# Patient Record
Sex: FEMALE | Race: WHITE | NOT HISPANIC OR LATINO | URBAN - METROPOLITAN AREA
[De-identification: names, ages, dates, MRNs, and addresses within clinical notes are randomized per-mention and may not be internally consistent; named-entity substitution may affect disease eponyms.]

---

## 2018-08-24 ENCOUNTER — APPOINTMENT (RX ONLY)
Dept: URBAN - METROPOLITAN AREA CLINIC 15 | Facility: CLINIC | Age: 58
Setting detail: DERMATOLOGY
End: 2018-08-24

## 2018-08-24 DIAGNOSIS — D22 MELANOCYTIC NEVI: ICD-10-CM

## 2018-08-24 DIAGNOSIS — D18.0 HEMANGIOMA: ICD-10-CM

## 2018-08-24 DIAGNOSIS — L57.3 POIKILODERMA OF CIVATTE: ICD-10-CM

## 2018-08-24 DIAGNOSIS — Z41.9 ENCOUNTER FOR PROCEDURE FOR PURPOSES OTHER THAN REMEDYING HEALTH STATE, UNSPECIFIED: ICD-10-CM

## 2018-08-24 DIAGNOSIS — L30.0 NUMMULAR DERMATITIS: ICD-10-CM

## 2018-08-24 PROBLEM — D22.5 MELANOCYTIC NEVI OF TRUNK: Status: ACTIVE | Noted: 2018-08-24

## 2018-08-24 PROBLEM — D18.01 HEMANGIOMA OF SKIN AND SUBCUTANEOUS TISSUE: Status: ACTIVE | Noted: 2018-08-24

## 2018-08-24 PROCEDURE — ? COUNSELING

## 2018-08-24 PROCEDURE — 99203 OFFICE O/P NEW LOW 30 MIN: CPT

## 2018-08-24 PROCEDURE — ? PRESCRIPTION

## 2018-08-24 PROCEDURE — ? RECOMMENDATIONS

## 2018-08-24 PROCEDURE — ? MEDICAL CONSULTATION: FILLERS

## 2018-08-24 RX ORDER — FLUOCINONIDE 0.5 MG/G
OINTMENT TOPICAL
Qty: 1 | Refills: 1 | Status: ERX | COMMUNITY
Start: 2018-08-24

## 2018-08-24 RX ADMIN — FLUOCINONIDE: 0.5 OINTMENT TOPICAL at 00:00

## 2018-08-24 ASSESSMENT — LOCATION SIMPLE DESCRIPTION DERM
LOCATION SIMPLE: RIGHT LOWER BACK
LOCATION SIMPLE: RIGHT BACK
LOCATION SIMPLE: RIGHT CHEEK
LOCATION SIMPLE: LEFT LOWER BACK
LOCATION SIMPLE: LEFT CHEEK
LOCATION SIMPLE: LEFT UPPER BACK
LOCATION SIMPLE: CHEST
LOCATION SIMPLE: RIGHT UPPER BACK

## 2018-08-24 ASSESSMENT — LOCATION DETAILED DESCRIPTION DERM
LOCATION DETAILED: LEFT SUPERIOR LATERAL LOWER BACK
LOCATION DETAILED: LEFT CENTRAL MALAR CHEEK
LOCATION DETAILED: RIGHT SUPERIOR LATERAL LOWER BACK
LOCATION DETAILED: LEFT MEDIAL SUPERIOR CHEST
LOCATION DETAILED: RIGHT INFERIOR UPPER BACK
LOCATION DETAILED: LEFT SUPERIOR UPPER BACK
LOCATION DETAILED: RIGHT INFERIOR MEDIAL MALAR CHEEK
LOCATION DETAILED: LEFT MID-UPPER BACK
LOCATION DETAILED: RIGHT SUPERIOR LATERAL UPPER BACK

## 2018-08-24 ASSESSMENT — SEVERITY ASSESSMENT: SEVERITY: MILD

## 2018-08-24 ASSESSMENT — LOCATION ZONE DERM
LOCATION ZONE: FACE
LOCATION ZONE: TRUNK

## 2018-08-24 ASSESSMENT — BSA RASH: BSA RASH: 1

## 2018-08-24 NOTE — PROCEDURE: RECOMMENDATIONS
Recommendation Preamble: The following recommendations were made during the visit:
Recommendations (Free Text): Fraxel laser $1200
Detail Level: Zone
Recommendations (Free Text): Restylane refyne 1 syringe $850 \\nRestylane defyne 1 syringe $850\\n\\n\\nClear and Brilliant face $500

## 2019-12-09 ENCOUNTER — APPOINTMENT (RX ONLY)
Dept: URBAN - METROPOLITAN AREA CLINIC 15 | Facility: CLINIC | Age: 59
Setting detail: DERMATOLOGY
End: 2019-12-09

## 2019-12-09 DIAGNOSIS — Z41.9 ENCOUNTER FOR PROCEDURE FOR PURPOSES OTHER THAN REMEDYING HEALTH STATE, UNSPECIFIED: ICD-10-CM

## 2019-12-09 PROCEDURE — ? ADDITIONAL NOTES

## 2019-12-09 PROCEDURE — ? MEDICAL CONSULTATION: FRACTIONAL RESURFACING

## 2019-12-09 PROCEDURE — ? COSMETIC CONSULTATION: FILLERS

## 2019-12-09 PROCEDURE — ? FILLERS

## 2019-12-09 PROCEDURE — ? COSMETIC CONSULTATION: SKIN PEN

## 2019-12-09 PROCEDURE — ? BOTOX

## 2019-12-09 PROCEDURE — ? COSMETIC CONSULTATION: BOTULINUM TOXIN

## 2019-12-09 ASSESSMENT — LOCATION SIMPLE DESCRIPTION DERM
LOCATION SIMPLE: RIGHT CHEEK
LOCATION SIMPLE: LEFT CHEEK

## 2019-12-09 ASSESSMENT — LOCATION DETAILED DESCRIPTION DERM
LOCATION DETAILED: RIGHT INFERIOR CENTRAL MALAR CHEEK
LOCATION DETAILED: RIGHT MEDIAL BUCCAL CHEEK
LOCATION DETAILED: LEFT MEDIAL BUCCAL CHEEK
LOCATION DETAILED: LEFT MEDIAL MALAR CHEEK
LOCATION DETAILED: LEFT INFERIOR CENTRAL MALAR CHEEK

## 2019-12-09 ASSESSMENT — LOCATION ZONE DERM: LOCATION ZONE: FACE

## 2019-12-09 NOTE — PROCEDURE: ADDITIONAL NOTES
Additional Notes: Botox 2 areas $600\\nRestylane lyft $800 1 syringe \\nRestylane defyne $850 1 syringe
Detail Level: Zone

## 2019-12-09 NOTE — PROCEDURE: FILLERS
Additional Area 3 Volume In Cc: 0
Lot #: [id number]
Cheeks Filler Volume In Cc: 1
Include Cannula Information In Note?: Yes
Include Cannula Size?: 22G
Consent: Written consent obtained. Risks include but not limited to bruising, beading, irregular texture, ulceration, infection, allergic reaction, scar formation, incomplete augmentation, temporary nature, procedural pain.
Post-Care Instructions: Patient instructed to apply ice to reduce swelling.
Map Statment: See [address] for Complete Details
Expiration Date (Month Year): 7/6/2020
Include Cannula Length?: 1.5 inch
Include Cannula Information In Note?: No
Lot #: [id number]
Filler: Restylane Defyne
Detail Level: Zone
Topical Anesthesia?: 20% benzocaine, 8% lidocaine, 4% tetracaine
Nasolabial Folds Filler Volume In Cc: 0.5
Expiration Date (Month Year): 2022-03-31
Price (Use Numbers Only, No Special Characters Or $): [address] [employer]
Lot #: [id number]
Filler: Marine Nielsen
Expiration Date (Month Year): 2022-04-30

## 2019-12-09 NOTE — HPI: COSMETIC CONSULTATION
Additional History: Patient would like a consultation for fillers. She was given a quote for Restylane defyne/refyne for the left [place] cheek and right inferior cheek in 2018. Each syringe $850 but the patient would like another consultation.

## 2019-12-09 NOTE — PROCEDURE: BOTOX
Price (Use Numbers Only, No Special Characters Or $): [address] [facility]
Post-Care Instructions: Patient instructed to not lie down for 4 hours and limit physical activity for 24 hours. Patient instructed not to travel by airplane for 48 hours.
Show Additional Area 4: Yes
Depressor Anguli Oris Units: 0
Show Mentalis Units: No
Consent: Written consent obtained. Risks include but not limited to lid/brow ptosis, bruising, swelling, diplopia, temporary effect, incomplete chemical denervation.
Lot #: [id number]
Glabellar Complex Units: [address] [facility]
Expiration Date (Month Year): 04/2022
Detail Level: Zone
Dilution (U/0.1 Cc): 4
Periorbital Skin Units: 24

## 2019-12-30 ENCOUNTER — APPOINTMENT (RX ONLY)
Dept: URBAN - METROPOLITAN AREA CLINIC 15 | Facility: CLINIC | Age: 59
Setting detail: DERMATOLOGY
End: 2019-12-30

## 2019-12-30 DIAGNOSIS — Z41.9 ENCOUNTER FOR PROCEDURE FOR PURPOSES OTHER THAN REMEDYING HEALTH STATE, UNSPECIFIED: ICD-10-CM

## 2019-12-30 PROCEDURE — ? BOTOX

## 2019-12-30 PROCEDURE — ? ADDITIONAL NOTES

## 2019-12-30 ASSESSMENT — LOCATION DETAILED DESCRIPTION DERM
LOCATION DETAILED: LEFT INFERIOR TEMPLE
LOCATION DETAILED: RIGHT INFERIOR TEMPLE

## 2019-12-30 ASSESSMENT — LOCATION SIMPLE DESCRIPTION DERM
LOCATION SIMPLE: RIGHT TEMPLE
LOCATION SIMPLE: LEFT TEMPLE

## 2019-12-30 ASSESSMENT — LOCATION ZONE DERM: LOCATION ZONE: FACE

## 2019-12-30 NOTE — PROCEDURE: ADDITIONAL NOTES
Detail Level: Zone
Additional Notes: Botox touch up at no charge\\n\\nVollure for marionettes and nasolabial folds

## 2019-12-30 NOTE — PROCEDURE: BOTOX
Show Additional Area 5: Yes
Consent: Written consent obtained. Risks include but not limited to lid/brow ptosis, bruising, swelling, diplopia, temporary effect, incomplete chemical denervation.
Right Periorbital Units: 0
Show Right And Left Pupillary Line Units: No
Expiration Date (Month Year): 06/2022
Lot #: [id number]
Post-Care Instructions: Patient instructed to not lie down for 4 hours and limit physical activity for 24 hours. Patient instructed not to travel by airplane for 48 hours.
Dilution (U/0.1 Cc): 4
Detail Level: Zone

## 2020-01-20 ENCOUNTER — APPOINTMENT (RX ONLY)
Dept: URBAN - METROPOLITAN AREA CLINIC 15 | Facility: CLINIC | Age: 60
Setting detail: DERMATOLOGY
End: 2020-01-20

## 2020-01-20 DIAGNOSIS — Z41.9 ENCOUNTER FOR PROCEDURE FOR PURPOSES OTHER THAN REMEDYING HEALTH STATE, UNSPECIFIED: ICD-10-CM

## 2020-01-20 DIAGNOSIS — L30.8 OTHER SPECIFIED DERMATITIS: ICD-10-CM

## 2020-01-20 PROCEDURE — ? TREATMENT REGIMEN

## 2020-01-20 PROCEDURE — ? COUNSELING

## 2020-01-20 PROCEDURE — ? COSMETIC FOLLOW-UP

## 2020-01-20 PROCEDURE — ? PRESCRIPTION

## 2020-01-20 ASSESSMENT — LOCATION DETAILED DESCRIPTION DERM
LOCATION DETAILED: RIGHT CENTRAL BUCCAL CHEEK
LOCATION DETAILED: LEFT CENTRAL BUCCAL CHEEK

## 2020-01-20 ASSESSMENT — PAIN INTENSITY VAS: HOW INTENSE IS YOUR PAIN 0 BEING NO PAIN, 10 BEING THE MOST SEVERE PAIN POSSIBLE?: 1/10 PAIN

## 2020-01-20 ASSESSMENT — LOCATION ZONE DERM: LOCATION ZONE: FACE

## 2020-01-20 ASSESSMENT — BSA RASH: BSA RASH: 2

## 2020-01-20 ASSESSMENT — SEVERITY ASSESSMENT: SEVERITY: MODERATE

## 2020-01-20 ASSESSMENT — LOCATION SIMPLE DESCRIPTION DERM
LOCATION SIMPLE: RIGHT CHEEK
LOCATION SIMPLE: LEFT CHEEK

## 2020-01-20 NOTE — PROCEDURE: COSMETIC FOLLOW-UP
Global Improvement: Good
Comments (Free Text): Patient presents today with filler nodules on the marionette lines. Will start patient on a treatment plan. Will re evaluate patient in one week. Also, discussed with patient about possible hylenex injection to be performed at next visit if not improved with treatment plan.
Side Effects Or Complications: Swelling
Price (Use Numbers Only, No Special Characters Or $): 0
Treatment (Optional): Filler Injection
Patient Satisfaction: Unsure
Detail Level: Zone

## 2020-01-20 NOTE — HPI: COSMETIC FOLLOW UP
How Did You Tolerate The Procedure?: not well
What Condition Are We Treating?: Cosmetic follow up
What Procedure Did We Perform At The Last Visit?: Restylane Lyft and Restylane Defyne
What Was The Problem (Use Complete Sentences)?: Have nodules on the marionette lines

## 2020-01-20 NOTE — PROCEDURE: TREATMENT REGIMEN
Plan: patient developed a delayed nodular hypersensitivity reaction to the filler. Patient recalls possible URI symptoms around the time this began, which was approx 1 week ago. Patient has been taking zyrtec daily and notes mild improvement. Explained to patient that we may need to dissolve filler at sites where she has this reaction at follow up if no improvement. Patient states she loves the results and is upset to hear that we may have to dissolve the filler. Reaction at sites where Restylane Defyne was used.
Detail Level: Zone

## 2020-01-27 ENCOUNTER — APPOINTMENT (RX ONLY)
Dept: URBAN - METROPOLITAN AREA CLINIC 15 | Facility: CLINIC | Age: 60
Setting detail: DERMATOLOGY
End: 2020-01-27

## 2020-01-27 DIAGNOSIS — L30.8 OTHER SPECIFIED DERMATITIS: ICD-10-CM | Status: IMPROVED

## 2020-01-27 PROCEDURE — 11900 INJECT SKIN LESIONS </W 7: CPT

## 2020-01-27 PROCEDURE — ? TREATMENT REGIMEN

## 2020-01-27 PROCEDURE — ? INTRALESIONAL KENALOG

## 2020-01-27 PROCEDURE — ? COUNSELING

## 2020-01-27 ASSESSMENT — LOCATION DETAILED DESCRIPTION DERM
LOCATION DETAILED: RIGHT CENTRAL BUCCAL CHEEK
LOCATION DETAILED: LEFT INFERIOR VERMILION BORDER
LOCATION DETAILED: LEFT LOWER CUTANEOUS LIP
LOCATION DETAILED: LEFT CENTRAL BUCCAL CHEEK

## 2020-01-27 ASSESSMENT — LOCATION SIMPLE DESCRIPTION DERM
LOCATION SIMPLE: RIGHT CHEEK
LOCATION SIMPLE: LEFT LOWER LIP
LOCATION SIMPLE: LEFT CHEEK
LOCATION SIMPLE: LEFT LIP

## 2020-01-27 ASSESSMENT — LOCATION ZONE DERM
LOCATION ZONE: LIP
LOCATION ZONE: FACE

## 2020-01-27 NOTE — PROCEDURE: INTRALESIONAL KENALOG
Kenalog Preparation: Kenalog
Detail Level: Simple
Medical Necessity Clause: This procedure was medically necessary because the lesions that were treated were:
Consent: The risks of atrophy were reviewed with the patient.
Administered By (Optional): Dr. Alina Guzman
Include Z78.9 (Other Specified Conditions Influencing Health Status) As An Associated Diagnosis?: No
Ndc# For Kenalog Only: Manda Felix 47 7587-7120-92
Total Volume Injected (Ccs- Only Use Numbers And Decimals): 0.5
Concentration Of Solution Injected (Mg/Ml): 2.5
X Size Of Lesion In Cm (Optional): 0

## 2020-01-27 NOTE — PROCEDURE: TREATMENT REGIMEN
Detail Level: Zone
Plan: Brandon Congress Reaction at sites where Restylane Defyne was used. Patient improved with prednisone and doxycycline, which she started approx 5 days ago. Will assess for continued improvement with ILK.  if not, plan for hyaluronidase

## 2020-05-29 ENCOUNTER — APPOINTMENT (RX ONLY)
Dept: URBAN - METROPOLITAN AREA CLINIC 15 | Facility: CLINIC | Age: 60
Setting detail: DERMATOLOGY
End: 2020-05-29

## 2020-05-29 DIAGNOSIS — L57.0 ACTINIC KERATOSIS: ICD-10-CM

## 2020-05-29 DIAGNOSIS — R23.3 SPONTANEOUS ECCHYMOSES: ICD-10-CM

## 2020-05-29 DIAGNOSIS — L11.1 TRANSIENT ACANTHOLYTIC DERMATOSIS [GROVER]: ICD-10-CM

## 2020-05-29 DIAGNOSIS — L30.8 OTHER SPECIFIED DERMATITIS: ICD-10-CM | Status: RESOLVED

## 2020-05-29 PROCEDURE — ? PRESCRIPTION

## 2020-05-29 PROCEDURE — 17000 DESTRUCT PREMALG LESION: CPT

## 2020-05-29 PROCEDURE — ? COUNSELING

## 2020-05-29 PROCEDURE — ? LIQUID NITROGEN

## 2020-05-29 PROCEDURE — 17003 DESTRUCT PREMALG LES 2-14: CPT

## 2020-05-29 PROCEDURE — ? TREATMENT REGIMEN

## 2020-05-29 PROCEDURE — 99214 OFFICE O/P EST MOD 30 MIN: CPT | Mod: 25

## 2020-05-29 RX ORDER — TRIAMCINOLONE ACETONIDE 1 MG/G
1 CREAM TOPICAL BID
Qty: 2 | Refills: 0 | Status: ERX | COMMUNITY
Start: 2020-05-29

## 2020-05-29 RX ORDER — KETOCONAZOLE 20 MG/ML
1 SHAMPOO TOPICAL
Qty: 1 | Refills: 2 | Status: ERX | COMMUNITY
Start: 2020-05-29

## 2020-05-29 RX ADMIN — TRIAMCINOLONE ACETONIDE 1: 1 CREAM TOPICAL at 00:00

## 2020-05-29 RX ADMIN — KETOCONAZOLE 1: 20 SHAMPOO TOPICAL at 00:00

## 2020-05-29 ASSESSMENT — LOCATION DETAILED DESCRIPTION DERM
LOCATION DETAILED: LEFT LATERAL SUPERIOR CHEST
LOCATION DETAILED: LEFT MEDIAL SUPERIOR CHEST
LOCATION DETAILED: RIGHT CENTRAL BUCCAL CHEEK
LOCATION DETAILED: UPPER STERNUM
LOCATION DETAILED: RIGHT POSTERIOR ANKLE
LOCATION DETAILED: LEFT CENTRAL BUCCAL CHEEK
LOCATION DETAILED: RIGHT INFERIOR LATERAL NECK
LOCATION DETAILED: RIGHT LATERAL SUPERIOR CHEST
LOCATION DETAILED: MIDDLE STERNUM

## 2020-05-29 ASSESSMENT — LOCATION SIMPLE DESCRIPTION DERM
LOCATION SIMPLE: CHEST
LOCATION SIMPLE: RIGHT ANKLE
LOCATION SIMPLE: RIGHT CHEEK
LOCATION SIMPLE: LEFT CHEEK
LOCATION SIMPLE: RIGHT ANTERIOR NECK

## 2020-05-29 ASSESSMENT — LOCATION ZONE DERM
LOCATION ZONE: LEG
LOCATION ZONE: TRUNK
LOCATION ZONE: FACE
LOCATION ZONE: NECK

## 2020-05-29 ASSESSMENT — SEVERITY ASSESSMENT: SEVERITY: CLEAR

## 2020-05-29 NOTE — PROCEDURE: REASSURANCE
Additional Notes (Optional): Come back in 2-3 weeks. If it hasnât disappeared.
Hide Additional Notes?: No
Detail Level: Zone

## 2020-05-29 NOTE — PROCEDURE: LIQUID NITROGEN
Render Post-Care Instructions In Note?: yes
Render Note In Bullet Format When Appropriate: No
Detail Level: Zone
Consent: The patient's consent was obtained including but not limited to risks of crusting, scabbing, blistering, scarring, darker or lighter pigmentary change, recurrence, incomplete removal and infection.
Post-Care Instructions: I reviewed with the patient in detail post-care instructions. Patient is to wear sunprotection, and avoid picking at any of the treated lesions. Pt may apply Vaseline to crusted or scabbing areas.
Duration Of Freeze Thaw-Cycle (Seconds): 5
Number Of Freeze-Thaw Cycles: 1 freeze-thaw cycle

## 2020-05-29 NOTE — PROCEDURE: TREATMENT REGIMEN
Initiate Treatment: . \\nKetoconazole 2% shampoo 2-3 times a week. \\nTriamcinolone 0.1% cream twice daily as needed.
Detail Level: Zone
Plan: Merlin Rivera Reaction at sites where Restylane Defyne was used. Patient improved with prednisone and doxycycline.  Completely resolved with ILK

## 2021-04-09 ENCOUNTER — APPOINTMENT (RX ONLY)
Dept: URBAN - METROPOLITAN AREA CLINIC 15 | Facility: CLINIC | Age: 61
Setting detail: DERMATOLOGY
End: 2021-04-09

## 2021-04-09 DIAGNOSIS — L57.0 ACTINIC KERATOSIS: ICD-10-CM

## 2021-04-09 DIAGNOSIS — D22 MELANOCYTIC NEVI: ICD-10-CM

## 2021-04-09 DIAGNOSIS — D18.0 HEMANGIOMA: ICD-10-CM

## 2021-04-09 DIAGNOSIS — L82.1 OTHER SEBORRHEIC KERATOSIS: ICD-10-CM

## 2021-04-09 DIAGNOSIS — Z41.9 ENCOUNTER FOR PROCEDURE FOR PURPOSES OTHER THAN REMEDYING HEALTH STATE, UNSPECIFIED: ICD-10-CM

## 2021-04-09 PROBLEM — D22.5 MELANOCYTIC NEVI OF TRUNK: Status: ACTIVE | Noted: 2021-04-09

## 2021-04-09 PROBLEM — D18.01 HEMANGIOMA OF SKIN AND SUBCUTANEOUS TISSUE: Status: ACTIVE | Noted: 2021-04-09

## 2021-04-09 PROCEDURE — 99213 OFFICE O/P EST LOW 20 MIN: CPT | Mod: 25

## 2021-04-09 PROCEDURE — ? DYSPORT

## 2021-04-09 PROCEDURE — ? COUNSELING

## 2021-04-09 PROCEDURE — ? COSMETIC CONSULTATION: FILLERS

## 2021-04-09 PROCEDURE — ? LIQUID NITROGEN

## 2021-04-09 PROCEDURE — 17000 DESTRUCT PREMALG LESION: CPT

## 2021-04-09 PROCEDURE — 17003 DESTRUCT PREMALG LES 2-14: CPT

## 2021-04-09 ASSESSMENT — LOCATION SIMPLE DESCRIPTION DERM
LOCATION SIMPLE: LEFT UPPER BACK
LOCATION SIMPLE: NOSE
LOCATION SIMPLE: LEFT SHOULDER
LOCATION SIMPLE: UPPER BACK
LOCATION SIMPLE: LEFT BREAST

## 2021-04-09 ASSESSMENT — LOCATION DETAILED DESCRIPTION DERM
LOCATION DETAILED: LEFT NASAL DORSUM
LOCATION DETAILED: LEFT ANTERIOR SHOULDER
LOCATION DETAILED: INFERIOR THORACIC SPINE
LOCATION DETAILED: LEFT INFERIOR UPPER BACK
LOCATION DETAILED: LEFT SUPERIOR MEDIAL UPPER BACK
LOCATION DETAILED: LEFT LATERAL BREAST 4-5:00 REGION

## 2021-04-09 ASSESSMENT — LOCATION ZONE DERM
LOCATION ZONE: NOSE
LOCATION ZONE: TRUNK
LOCATION ZONE: ARM

## 2021-04-09 NOTE — PROCEDURE: DYSPORT
Periorbital Skin Units: [address]
Additional Area 2 Units: 0
Show Additional Area 5: Yes
Expiration Date (Month Year): 11/30/2021
Lot #: [id number]
Reconstitution Date (Optional): 4/9/2021
Show Ucl Units: No
Detail Level: Detailed
Consent: Written consent obtained. Risks include but not limited to lid/brow ptosis, bruising, swelling, diplopia, temporary effect, incomplete chemical denervation.
Price (Use Numbers Only, No Special Characters Or $): [address] [facility]
Additional Area 1 Location: brows
Dilution (U/ 0.1cc): 12
Post-Care Instructions: Patient instructed to not lie down for 4 hours and limit physical activity for 24 hours. Patient instructed not to travel by airplane for 48 hours.

## 2021-05-28 ENCOUNTER — APPOINTMENT (RX ONLY)
Dept: URBAN - METROPOLITAN AREA CLINIC 15 | Facility: CLINIC | Age: 61
Setting detail: DERMATOLOGY
End: 2021-05-28

## 2021-05-28 DIAGNOSIS — Z41.9 ENCOUNTER FOR PROCEDURE FOR PURPOSES OTHER THAN REMEDYING HEALTH STATE, UNSPECIFIED: ICD-10-CM

## 2021-05-28 PROCEDURE — ? FILLERS

## 2021-05-28 NOTE — PROCEDURE: FILLERS
Lateral Face Filler  Volume In Cc: 0
Map Statment: See [address] for Complete Details
Include Cannula Length?: 1.5 inch
Include Cannula Information In Note?: No
Lot #: [id number]
Anesthesia Volume In Cc: 0.2
Expiration Date (Month Year): 11/30/2022
Topical Anesthesia?: 20% benzocaine, 8% lidocaine, 4% tetracaine
Additional Area 1 Location: chin
Include Cannula Size?: 22G
Lot #: [id number]
Detail Level: Detailed
Expiration Date (Month Year): 11/30/2023
Filler: Marine Nielsen
Include Cannula Information In Note?: Yes
Cheeks Filler Volume In Cc: 1
Lot #: [id number]
Price (Use Numbers Only, No Special Characters Or $): [address] [facility]
Consent: Written consent obtained. Risks include but not limited to bruising, beading, irregular texture, ulceration, infection, allergic reaction, scar formation, incomplete augmentation, temporary nature, procedural pain.
Filler Comments: Areas were cleansed with alcohol and hibiclens as appropriate.  Filler was injected in a clean and sterile manner
Post-Care Instructions: Patient instructed to apply ice to reduce swelling.
Expiration Date (Month Year): 7/6/2020
Anesthesia Type: 1% lidocaine with epinephrine

## 2024-05-16 ENCOUNTER — APPOINTMENT (RX ONLY)
Dept: URBAN - METROPOLITAN AREA CLINIC 23 | Facility: CLINIC | Age: 64
Setting detail: DERMATOLOGY
End: 2024-05-16

## 2024-05-16 DIAGNOSIS — Z41.9 ENCOUNTER FOR PROCEDURE FOR PURPOSES OTHER THAN REMEDYING HEALTH STATE, UNSPECIFIED: ICD-10-CM

## 2024-05-16 PROCEDURE — ? RECOMMENDATIONS

## 2024-05-16 PROCEDURE — ? IN-HOUSE DISPENSING PHARMACY

## 2024-05-16 PROCEDURE — ? DYSPORT

## 2024-05-16 NOTE — PROCEDURE: IN-HOUSE DISPENSING PHARMACY
Product 21 Refills: 0
Name Of Product 19: Diazepam 5mg
Product 34 Unit Type: mg
Name Of Product 2: Valtrex
Product 1 Unit Type: tube(s)
Product 11 Application Directions: Apply a thin layer to the acne prone areas in the AM
Product 4 Amount/Unit (Numbers Only): 1
Product 14 Application Directions: Apply to the under eyes in the Am and pm
Name Of Product 7: Latisse 5ml
Name Of Product 12: Retinol Lite
Product 11 Unit Type: bottle(s)
Name Of Product 3: Tretinoin 0.05% cream
Product 2 Application Directions: apply to affected areas left lower leg am and pm  as indicated
Product 7 Application Directions: Apply to lashes at bedtime daily as indicated
Product 12 Application Directions: Apply a pea size amount to the entire face at bedtime.
Product 2 Unit Type: tablets
Name Of Product 8: TNS Essential
Product 7 Unit Type: ml
Render Refills If Set To 0: No
Product 22 Application Directions: Apply to the affected area of the face
Product 10 Application Directions: Take 1 tablet 2 times daily for 3 days
Product 3 Amount/Unit (Numbers Only): 20
Product 13 Application Directions: Apply to the entire face in the Am and pm
Name Of Product 6: Prednisone 20mg
Name Of Product 1: Hydroquinone 4% / Tretinoin 0.05% / Fluocinolone 0.01%
Name Of Product 11: Clearing gel
Product 6 Application Directions: Take one tablet for swelling today at the office as indicated and one tablet tomorrow
Name Of Product 14: Skin Better Interfuse eye treatment cream
Product 1 Application Directions: Apply thin layer to right cheek every night  at bedtime only.
Product 21 Application Directions: Take one tablet 30 minutes prior to procedure
Product 2 Price/Unit (In Dollars): 0.00
Name Of Product 5: loratadine 10 mg
Detail Level: Zone
Product 9 Application Directions: Apply to the acne prone areas in the Am and Pm
Product 7 Amount/Unit (Numbers Only): 5
Product 9 Unit Type: jar(s)
Name Of Product 22: Brightening Pads
Name Of Product 13: Skin Better Even tone correcting serum
Name Of Product 10: Valtrex 500 mg
Product 2 Amount/Unit (Numbers Only): 6
Product 5 Application Directions: Take one tablet today after procedure with full glass of water as indicated
Product 3 Application Directions: Apply a pea size amount to the entire face at night
Product 8 Application Directions: Apply to the clean face in the AM and PM daily
Product 3 Unit Type: grams
Name Of Product 4: Valium 5 mg
Name Of Product 9: Clearing tone pads
Name Of Product 21: Valium 5mg
Product 4 Application Directions: Take one tablet half hour today prior to procedure as indicated. Dispense in office

## 2024-05-16 NOTE — PROCEDURE: RECOMMENDATIONS
Recommendation Preamble: The following recommendations were made during the visit:
Render Risk Assessment In Note?: no
Detail Level: Detailed
Recommendations (Free Text): Pavise\\nTretinoin .05%\\nTNS advanced\\nSofwave full face $4000 or lower face $2500

## 2024-05-16 NOTE — PROCEDURE: DYSPORT
Depressor Anguli Oris Units: 0
Additional Area 2 Location: crows feet
Consent: Written consent obtained. Risks include but not limited to lid/brow ptosis, bruising, swelling, diplopia, temporary effect, incomplete chemical denervation.
Show Ucl Units: No
Show Additional Area 6: Yes
Lot #: [id number]
Show Inventory Tab: Show
Dilution (U/0.1 Cc): 1.5
Periorbital Skin Units: 50
Additional Area 3 Location: glabella
Additional Area 4 Location: 2cm high forehead
Expiration Date (Month Year): 2022-08
Additional Area 1 Location: lateral brows
Price (Use Numbers Only, No Special Characters Or $): 0.00
Detail Level: Simple
Glabellar Complex Units: 60
Additional Area 1 Units: 10